# Patient Record
Sex: MALE | Race: WHITE | NOT HISPANIC OR LATINO | URBAN - METROPOLITAN AREA
[De-identification: names, ages, dates, MRNs, and addresses within clinical notes are randomized per-mention and may not be internally consistent; named-entity substitution may affect disease eponyms.]

---

## 2019-10-29 ENCOUNTER — EMERGENCY (EMERGENCY)
Facility: HOSPITAL | Age: 24
LOS: 1 days | Discharge: HOME | End: 2019-10-29
Attending: EMERGENCY MEDICINE | Admitting: EMERGENCY MEDICINE
Payer: COMMERCIAL

## 2019-10-29 VITALS — TEMPERATURE: 98 F | DIASTOLIC BLOOD PRESSURE: 98 MMHG | HEART RATE: 73 BPM | SYSTOLIC BLOOD PRESSURE: 144 MMHG

## 2019-10-29 VITALS
HEART RATE: 81 BPM | DIASTOLIC BLOOD PRESSURE: 97 MMHG | SYSTOLIC BLOOD PRESSURE: 144 MMHG | TEMPERATURE: 98 F | OXYGEN SATURATION: 100 % | RESPIRATION RATE: 18 BRPM

## 2019-10-29 LAB
AMPHET UR-MCNC: NEGATIVE — SIGNIFICANT CHANGE UP
APPEARANCE UR: CLEAR — SIGNIFICANT CHANGE UP
BARBITURATES UR SCN-MCNC: NEGATIVE — SIGNIFICANT CHANGE UP
BENZODIAZ UR-MCNC: NEGATIVE — SIGNIFICANT CHANGE UP
BILIRUB UR-MCNC: NEGATIVE — SIGNIFICANT CHANGE UP
BLOOD UR QL VISUAL: NEGATIVE — SIGNIFICANT CHANGE UP
CANNABINOIDS UR-MCNC: POSITIVE — SIGNIFICANT CHANGE UP
COCAINE METAB.OTHER UR-MCNC: NEGATIVE — SIGNIFICANT CHANGE UP
COLOR SPEC: SIGNIFICANT CHANGE UP
GLUCOSE UR-MCNC: NEGATIVE — SIGNIFICANT CHANGE UP
KETONES UR-MCNC: NEGATIVE — SIGNIFICANT CHANGE UP
LEUKOCYTE ESTERASE UR-ACNC: NEGATIVE — SIGNIFICANT CHANGE UP
METHADONE UR-MCNC: NEGATIVE — SIGNIFICANT CHANGE UP
NITRITE UR-MCNC: NEGATIVE — SIGNIFICANT CHANGE UP
OPIATES UR-MCNC: NEGATIVE — SIGNIFICANT CHANGE UP
OXYCODONE UR-MCNC: NEGATIVE — SIGNIFICANT CHANGE UP
PCP UR-MCNC: NEGATIVE — SIGNIFICANT CHANGE UP
PH UR: 6.5 — SIGNIFICANT CHANGE UP (ref 5–8)
PROT UR-MCNC: NEGATIVE — SIGNIFICANT CHANGE UP
SP GR SPEC: 1.01 — SIGNIFICANT CHANGE UP (ref 1–1.04)
UROBILINOGEN FLD QL: NORMAL — SIGNIFICANT CHANGE UP

## 2019-10-29 PROCEDURE — 99283 EMERGENCY DEPT VISIT LOW MDM: CPT

## 2019-10-29 NOTE — ED PROVIDER NOTE - PATIENT PORTAL LINK FT
You can access the FollowMyHealth Patient Portal offered by [facility] by registering at the following website: [email or web address]. By joining [facility]’s FollowMyHealth portal, you will also be able to view your health information using other applications (apps) compatible with our system.

## 2019-10-29 NOTE — ED PROVIDER NOTE - NSFOLLOWUPCLINICS_GEN_ALL_ED_FT
[facility]  Behavioral Health  [id number] [place] [address]  Phone: [phone]  Fax:   Follow Up Time:

## 2019-10-29 NOTE — ED ADULT NURSE NOTE - NSIMPLEMENTINTERV_GEN_ALL_ED
Implemented All Universal Safety Interventions:  [place] to call system. Call bell, personal items and telephone within reach. Instruct patient to call for assistance. Room bathroom lighting operational. Non-slip footwear when patient is off stretcher. Physically safe environment: no spills, clutter or unnecessary equipment. Stretcher in lowest position, wheels locked, appropriate side rails in place.

## 2019-10-29 NOTE — ED PROVIDER NOTE - ATTENDING CONTRIBUTION TO CARE
Dariel: 24 yom with depression comes to ED for inability to function. Pt sent to psychiatrist 2 months ago on meds but states he does not feel its working. No SI, no HI. On exam, pt is well appearing, no distress, clear lungs, normal cardiac, abd soft and non tender, calm cooperative. Stable for outpt psych.

## 2019-10-29 NOTE — ED ADULT TRIAGE NOTE - CHIEF COMPLAINT QUOTE
Pt hx of depression and anxiety c/o worsening symptoms over the past 2 weeks, compliant with medication, pt denies any present SI/HI no AH/VH, pt endorses that he has just been more depressed, has not been going to work.

## 2019-10-29 NOTE — ED PROVIDER NOTE - OBJECTIVE STATEMENT
This is a 24 yr old male hx depression with increased anxiety, depression unable to function. Pt states he is unable to work and do his daily activities for the last 2 month. He says last time seen a psychiatrist about 2 month ago and he started on Cymbalta and atarax. Upon arrival, calm, cooperative, organized no physical nor emotional behavioural distress. Denies SI/HI/AH/VH. Denies falling, punching or kicking any objects. Denies pain, SOB, fever, chills, chest and abdominal discomfort. Denies recent use of alcohol or illicit drug. No evidence of physical injuries.

## 2019-10-29 NOTE — ED ADULT NURSE REASSESSMENT NOTE - NS ED NURSE REASSESS COMMENT FT1
Evaluated and cleared by NP/ Psych.  Pt remains awake, calm at baseline mental status. Denies s/i h/i/avh  presently, pt d/c by NP resources provided by SW  pt verbalizing understanding of d/c instructions.
Patient is improved and stable condition, discharged as per NP Bereczky order, discharge instructions given, pt verbalized understanding and left ER a&ox3 picked up by family.

## 2019-10-29 NOTE — ED ADULT NURSE NOTE - OBJECTIVE STATEMENT
Received pt in  pt c/o increasing anxiety & depression pt denies si/hi/avh presently emotional reassurance provided safety & comfort measures maintained eval on going.

## 2019-10-29 NOTE — ED PROVIDER NOTE - CLINICAL SUMMARY MEDICAL DECISION MAKING FREE TEXT BOX
This is a 24 yr old male hx depression with increased anxiety, depression unable to function.   ua tox- WNL  Collateral info from mother Alejandra () who states pt had problem with xanax and they sent him for 30 days of rehab after started to see a therapist who put him on medication. He moved  back moved recently with parents. Mom states pt is depressed oversleeps, low mood and energy and tells all the time how much he dislike his job. Explained mother, pt denies any acute SI/HI and presenting well, will be discharged and follow up in [facility].   There is no clinical evidence of intoxication, or any acute medical problem requiring immediate intervention.

## 2020-06-13 ENCOUNTER — TRANSCRIPTION ENCOUNTER (OUTPATIENT)
Age: 25
End: 2020-06-13